# Patient Record
Sex: MALE | Race: BLACK OR AFRICAN AMERICAN | NOT HISPANIC OR LATINO | Employment: UNEMPLOYED | URBAN - METROPOLITAN AREA
[De-identification: names, ages, dates, MRNs, and addresses within clinical notes are randomized per-mention and may not be internally consistent; named-entity substitution may affect disease eponyms.]

---
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
## 2024-08-29 ENCOUNTER — APPOINTMENT (OUTPATIENT)
Age: 19
End: 2024-08-29
Payer: COMMERCIAL

## 2024-08-29 ENCOUNTER — OFFICE VISIT (OUTPATIENT)
Age: 19
End: 2024-08-29
Payer: COMMERCIAL

## 2024-08-29 VITALS
TEMPERATURE: 97.7 F | HEART RATE: 75 BPM | SYSTOLIC BLOOD PRESSURE: 121 MMHG | RESPIRATION RATE: 18 BRPM | WEIGHT: 186 LBS | OXYGEN SATURATION: 98 % | DIASTOLIC BLOOD PRESSURE: 69 MMHG

## 2024-08-29 DIAGNOSIS — S69.91XA INJURY OF RIGHT INDEX FINGER, INITIAL ENCOUNTER: ICD-10-CM

## 2024-08-29 DIAGNOSIS — S62.640A CLOSED NONDISPLACED FRACTURE OF PROXIMAL PHALANX OF RIGHT INDEX FINGER, INITIAL ENCOUNTER: Primary | ICD-10-CM

## 2024-08-29 PROCEDURE — 29130 APPL FINGER SPLINT STATIC: CPT | Performed by: STUDENT IN AN ORGANIZED HEALTH CARE EDUCATION/TRAINING PROGRAM

## 2024-08-29 PROCEDURE — 73140 X-RAY EXAM OF FINGER(S): CPT

## 2024-08-29 PROCEDURE — G0382 LEV 3 HOSP TYPE B ED VISIT: HCPCS | Performed by: STUDENT IN AN ORGANIZED HEALTH CARE EDUCATION/TRAINING PROGRAM

## 2024-08-29 NOTE — PROGRESS NOTES
[facility]        NAME: Hubert Medina is a 18 y.o. male  : 2005    MRN: [id number]  DATE: 2024  TIME: 12:15 PM    Assessment and Orders   Closed nondisplaced fracture of proximal phalanx of right index finger, initial encounter [S62.640A]  1. Closed nondisplaced fracture of proximal phalanx of right index finger, initial encounter  Ambulatory Referral to Orthopedic Surgery      2. Injury of right index finger, initial encounter  XR finger right second digit-index            Plan and Discussion      Symptoms and exam consistent with intraarticular fracture of the right PIP joint as seen on xray. Patient splinted with simple finger splint. Referred to orthopedics.         Risks and benefits discussed. Patient understands and agrees with the plan.     PATIENT INSTRUCTIONS    If tests have been performed at [facility], our office will contact you with results if changes need to be made to the care plan discussed with you at the visit.  You can review your full results on [facility] MyChart.    Follow up with PCP.     If any of the following occur, please report to your nearest ED for evaluation or call 911.   Difficultly breathing or shortness of breath  Chest pain  Acutely worsening symptoms.         Chief Complaint     Chief Complaint   Patient presents with    Hand Pain     Patient states he jammed his finger playing basket ball.          History of Present Illness       Hand Pain   The incident occurred more than 1 week ago (hit finger while playing basketball 3 weeks ago). The incident occurred at the gym. The injury mechanism was a direct blow. The pain is present in the right fingers. The quality of the pain is described as aching. The pain does not radiate. Pertinent negatives include no chest pain, muscle weakness, numbness or tingling.       Review of Systems   Review of Systems   Cardiovascular:  Negative for chest pain.   Neurological:  Negative for tingling and numbness.          Current Medications     No current outpatient medications on file.    Current Allergies     Allergies as of 08/29/2024    (No Known Allergies)            The following portions of the patient's history were reviewed and updated as appropriate: allergies, current medications, past family history, past medical history, past social history, past surgical history and problem list.     No past medical history on file.    Past Surgical History:   Procedure Laterality Date    CIRCUMCISION         Family History   Problem Relation Age of Onset    Allergies Father     Asthma Father         seasonal    Hypertension Mother     Hyperlipidemia Mother     Anemia Mother     No Known Problems Sister     No Known Problems Brother     No Known Problems Maternal Grandmother     Hyperlipidemia Maternal Grandfather     Hypertension Maternal Grandfather     No Known Problems Paternal Grandmother     Heart disease Paternal Grandfather 62         Medications have been verified.        Objective   /69   Pulse 75   Temp 97.7 °F (36.5 °C)   Resp 18   Wt 84.4 kg (186 lb)   SpO2 98%   No LMP for male patient.       Physical Exam     Physical Exam  Constitutional:       General: He is not in acute distress.  Pulmonary:      Effort: Pulmonary effort is normal. No respiratory distress.   Musculoskeletal:         General: Swelling, tenderness and signs of injury present.        Hands:    Neurological:      General: No focal deficit present.      Mental Status: He is alert and oriented to person, place, and time.   Psychiatric:         Mood and Affect: Mood normal.         Behavior: Behavior normal.               Maru Cano DO

## 2024-08-30 ENCOUNTER — TELEPHONE (OUTPATIENT)
Age: 19
End: 2024-08-30

## 2024-08-30 NOTE — TELEPHONE ENCOUNTER
Hello,    Please advise if a forced appointment can be accommodated for the patient:    Call back #: [phone]    Insurance: [employer]    Reason for appointment: Closed nondisplaced fracture of proximal phalanx of right index finger     Requested doctor and/or location: [email or web address]       Thank you.

## 2024-09-03 ENCOUNTER — TELEPHONE (OUTPATIENT)
Dept: OBGYN CLINIC | Facility: HOSPITAL | Age: 19
End: 2024-09-03

## 2024-09-03 NOTE — TELEPHONE ENCOUNTER
Hello,    Please advise if a forced appointment can be accommodated for the patient:    Call back #: [phone]     Insurance: [employer]     Reason for appointment: closed nondisp fx right index   DOI about 4 weeks ago. offered and declined avail PPR hand     Requested doctor and/or location: hand      Thank you.

## 2024-09-06 ENCOUNTER — OFFICE VISIT (OUTPATIENT)
Dept: OBGYN CLINIC | Facility: CLINIC | Age: 19
End: 2024-09-06
Payer: COMMERCIAL

## 2024-09-06 ENCOUNTER — HOSPITAL ENCOUNTER (OUTPATIENT)
Dept: RADIOLOGY | Facility: HOSPITAL | Age: 19
End: 2024-09-06
Attending: STUDENT IN AN ORGANIZED HEALTH CARE EDUCATION/TRAINING PROGRAM
Payer: COMMERCIAL

## 2024-09-06 VITALS — WEIGHT: 189.4 LBS | BODY MASS INDEX: 25.65 KG/M2 | HEIGHT: 72 IN

## 2024-09-06 DIAGNOSIS — S62.640A CLOSED NONDISPLACED FRACTURE OF PROXIMAL PHALANX OF RIGHT INDEX FINGER, INITIAL ENCOUNTER: ICD-10-CM

## 2024-09-06 PROCEDURE — 73140 X-RAY EXAM OF FINGER(S): CPT

## 2024-09-06 PROCEDURE — 99204 OFFICE O/P NEW MOD 45 MIN: CPT | Performed by: STUDENT IN AN ORGANIZED HEALTH CARE EDUCATION/TRAINING PROGRAM

## 2024-09-06 NOTE — PROGRESS NOTES
ORTHOPAEDIC HAND, WRIST, AND ELBOW OFFICE  VISIT      ASSESSMENT/PLAN:      Diagnoses and all orders for this visit:    Closed nondisplaced fracture of proximal phalanx of right index finger, initial encounter  -     XR finger right second digit-index; Future  -     Ambulatory Referral to Orthopedic Surgery          18 y.o. male with R index finger P1 intraarticular fx sustained about 1 month ago.  XRs reviewed with pt.   Anatomy and treatment options and expected outcomes were discussed. Explained that since this is already a month out and the joint overall is well-aligned, would recommend continued conservative management. Discussed risk of displacement that may warrant surgical management.  The patient verbalized understanding of exam findings and treatment plan.   The patient was given the opportunity to ask questions.  Questions were answered to the patient's satisfaction.  The patient decided to move forward with splinting of the finger. He was instructed to take this off a few times a day and work on gentle ROM.  Discussed likely potential to prescribe therapy at his next visit if XRs look good.  Realistically, may be looking at 4-6 weeks of no football (pt is a ), but this will be better estimated at his follow up.       Follow Up:  2 weeks       To Do Next Visit:  X-rays of the  right  index finger      Discussions:  Fracture - Nonoperative Care: The physiology of a fractured bone was discussed with the patient today.  With non-displaced or minimally displaced fractures, conservative treatment such as casting or splinting often results in a functional recovery.  Typically, these fractures are immobilized in either a cast or splint depending on the pattern.  Radiographs are typically taken at intervals throughout the fracture healing to ensure that reduction or alignment is not lost.  If the fracture loses its alignment, surgical intervention may be required to stabilize it.  Medical conditions such  as diabetes, osteoporosis, vitamin D deficiency, and a history of or exposure to smoking may delay or prevent fracture healing. Options between cast/splint immobilization and surgical treatment were offered and the risks and benefits of both were discussed.       Antonio Olguin MD  Attending, Orthopaedic Surgery  Hand, Wrist, and Elbow Surgery  [facility]    ______________________________________________________________________________________________    CHIEF COMPLAINT:  Chief Complaint   Patient presents with   • Right Index Finger - Pain       SUBJECTIVE:  Patient is a 18 y.o. RHD male who presents today for evaluation and treatment of right index finger pain. Pt describes hitting his finger while playing basketball about a month ago. He thought he had just jammed it so did not get seen initially.  Pt went to urgent care on 8/29 where xrays at that time revealed intraarticular fracture. Sine then he has used a splint.      Occupation: Student - sociology. Plays football.     I have personally reviewed all the relevant PMH, PSH, SH, FH, Medications and allergies      PAST MEDICAL HISTORY:  History reviewed. No pertinent past medical history.    PAST SURGICAL HISTORY:  Past Surgical History:   Procedure Laterality Date   • CIRCUMCISION         FAMILY HISTORY:  Family History   Problem Relation Age of Onset   • Allergies Father    • Asthma Father         seasonal   • Hypertension Mother    • Hyperlipidemia Mother    • Anemia Mother    • No Known Problems Sister    • No Known Problems Brother    • No Known Problems Maternal Grandmother    • Hyperlipidemia Maternal Grandfather    • Hypertension Maternal Grandfather    • No Known Problems Paternal Grandmother    • Heart disease Paternal Grandfather 62       SOCIAL HISTORY:  Social History     Tobacco Use   • Smoking status: Never   • Smokeless tobacco: Never   Substance Use Topics   • Alcohol use: No   • Drug use: No       MEDICATIONS:  No  "current outpatient medications on file.    ALLERGIES:  No Known Allergies        REVIEW OF SYSTEMS:  Musculoskeletal:        As noted in HPI.   All other systems reviewed and are negative.    VITALS:  There were no vitals filed for this visit.    LABS:  HgA1c: No results found for: \"HGBA1C\"  BMP: No results found for: \"GLUCOSE\", \"CALCIUM\", \"NA\", \"K\", \"CO2\", \"CL\", \"BUN\", \"CREATININE\"    _____________________________________________________  PHYSICAL EXAMINATION:  General: Well developed and well nourished, alert & oriented x 3, appears comfortable  Psychiatric: Normal  HEENT: Normocephalic, Atraumatic Trachea Midline, No torticollis  Pulmonary: No audible wheezing or respiratory distress   Abdomen/GI: Non tender, non distended   Cardiovascular: No pitting edema, 2+ radial pulse   Skin: No masses, erythema, lacerations, fluctation, ulcerations  Neurovascular: Sensation Intact to the Median, Ulnar, Radial Nerve, Motor Intact to the Median, Ulnar, Radial Nerve, and Pulses Intact  Musculoskeletal: Normal, except as noted in detailed exam and in HPI.      MUSCULOSKELETAL EXAMINATION:  R Index Finger:  PIP 10 degrees shy full extension.   PIP flexion to 30 degrees.  No obvious malrotation/deformity.  Brisk capillary refill.   ___________________________________________________  STUDIES REVIEWED:  Xrays of the right index finger were reviewed and independently interpreted in PACS by Dr. Olguin and demonstrate evidence of a subacute intraarticular fracture of the distal proximal phalanx. There is a very slight step off at the joint but overall joint is well-aligned.           PROCEDURES PERFORMED:  Procedures  No Procedures performed today    _____________________________________________________      Scribe Attestation    I,:  Rachele eMndoza PA-C am acting as a scribe while in the presence of the attending physician.:       I,:  Antonio Olguin MD personally performed the services described in this documentation    as " scribed in my presence.:

## 2024-10-21 ENCOUNTER — OFFICE VISIT (OUTPATIENT)
Dept: OBGYN CLINIC | Facility: CLINIC | Age: 19
End: 2024-10-21
Payer: COMMERCIAL

## 2024-10-21 ENCOUNTER — HOSPITAL ENCOUNTER (OUTPATIENT)
Dept: RADIOLOGY | Facility: HOSPITAL | Age: 19
Discharge: HOME | End: 2024-10-21
Attending: STUDENT IN AN ORGANIZED HEALTH CARE EDUCATION/TRAINING PROGRAM
Payer: COMMERCIAL

## 2024-10-21 VITALS — HEIGHT: 72 IN | BODY MASS INDEX: 25.6 KG/M2 | WEIGHT: 189 LBS

## 2024-10-21 DIAGNOSIS — S62.640D CLOSED NONDISPLACED FRACTURE OF PROXIMAL PHALANX OF RIGHT INDEX FINGER WITH ROUTINE HEALING, SUBSEQUENT ENCOUNTER: Primary | ICD-10-CM

## 2024-10-21 DIAGNOSIS — S62.640A CLOSED NONDISPLACED FRACTURE OF PROXIMAL PHALANX OF RIGHT INDEX FINGER, INITIAL ENCOUNTER: ICD-10-CM

## 2024-10-21 PROCEDURE — 99213 OFFICE O/P EST LOW 20 MIN: CPT | Performed by: STUDENT IN AN ORGANIZED HEALTH CARE EDUCATION/TRAINING PROGRAM

## 2024-10-21 PROCEDURE — 73140 X-RAY EXAM OF FINGER(S): CPT

## 2024-10-23 ENCOUNTER — TELEPHONE (OUTPATIENT)
Age: 19
End: 2024-10-23

## 2024-10-23 NOTE — TELEPHONE ENCOUNTER
Caller: Patient     Doctor: N/A    Reason for call: Patient called for office referral to be placed. Informed patient to contact PCP.     Call back#: N/A